# Patient Record
Sex: MALE | Race: WHITE | Employment: OTHER | URBAN - METROPOLITAN AREA
[De-identification: names, ages, dates, MRNs, and addresses within clinical notes are randomized per-mention and may not be internally consistent; named-entity substitution may affect disease eponyms.]

---

## 2022-08-08 NOTE — PROGRESS NOTES
Patient reached ____ yes  __X___ no   VM instructions left X____ yes   phone number _[phone]_______                                ____ no-office notified          Date __8/11/22_______  Time _0800______  Arrival __0630  h[facility]____    Nothing to eat or drink after midnight-follow your doctors prep instructions-this may include taking a second dose of your prep after midnight  Responsible adult 25 or older to stay on site while you are here-drive you home-stay with you after  Follow any instructions your doctors office has given you  Bring a complete list of all your medications and supplements including name,dose,how often taken the day of your procedure  If you normally take the following medications in the morning please do so the AM of your procedure with a small sip of water       Heart,blood pressure,seizure,thyroid or breathing medications-use your inhalers       DO NOT take blood pressure medications ending in \"kip\" or \"pril\" the AM of procedure or evening prior  Take half or your normal dose of any long acting insulins the night before your procedure-do not take any diabetic medications the AM of procedure  Follow your doctors instructions regarding stopping or taking  any blood thinners-if you do not have instructions-call them- INSTRUCTIONS TO [address] [place] [address]  Any questions call your doctor  Other ______________________________________________________________                [facility] POLICY(subject to change)             The current policy is 2 visitors per patient. There are no children allowed. Everyone must mask. Visiting hours are 8a-8p. Overnight visitors will be at the discretion of the nurse.

## 2022-08-11 ENCOUNTER — HOSPITAL ENCOUNTER (OUTPATIENT)
Age: 87
Setting detail: OUTPATIENT SURGERY
Discharge: HOME | End: 2022-08-11
Attending: INTERNAL MEDICINE | Admitting: INTERNAL MEDICINE
Payer: MEDICARE

## 2022-08-11 ENCOUNTER — ANESTHESIA EVENT (OUTPATIENT)
Dept: ENDOSCOPY | Age: 87
End: 2022-08-11
Payer: MEDICARE

## 2022-08-11 ENCOUNTER — ANESTHESIA (OUTPATIENT)
Dept: ENDOSCOPY | Age: 87
End: 2022-08-11
Payer: MEDICARE

## 2022-08-11 VITALS
OXYGEN SATURATION: 94 % | TEMPERATURE: 97 F | BODY MASS INDEX: 27.55 KG/M2 | SYSTOLIC BLOOD PRESSURE: 152 MMHG | HEART RATE: 55 BPM | HEIGHT: 69 IN | RESPIRATION RATE: 16 BRPM | DIASTOLIC BLOOD PRESSURE: 81 MMHG | WEIGHT: 186 LBS

## 2022-08-11 DIAGNOSIS — R13.10 DYSPHAGIA, UNSPECIFIED TYPE: ICD-10-CM

## 2022-08-11 PROCEDURE — 7100000011 HC PHASE II RECOVERY - ADDTL 15 MIN: Performed by: INTERNAL MEDICINE

## 2022-08-11 PROCEDURE — 7100000001 HC PACU RECOVERY - ADDTL 15 MIN: Performed by: INTERNAL MEDICINE

## 2022-08-11 PROCEDURE — 2709999900 HC NON-CHARGEABLE SUPPLY: Performed by: INTERNAL MEDICINE

## 2022-08-11 PROCEDURE — 2580000003 HC RX 258: Performed by: NURSE ANESTHETIST, CERTIFIED REGISTERED

## 2022-08-11 PROCEDURE — 3700000000 HC ANESTHESIA ATTENDED CARE: Performed by: INTERNAL MEDICINE

## 2022-08-11 PROCEDURE — 2500000003 HC RX 250 WO HCPCS: Performed by: NURSE ANESTHETIST, CERTIFIED REGISTERED

## 2022-08-11 PROCEDURE — 3700000001 HC ADD 15 MINUTES (ANESTHESIA): Performed by: INTERNAL MEDICINE

## 2022-08-11 PROCEDURE — 2580000003 HC RX 258: Performed by: ANESTHESIOLOGY

## 2022-08-11 PROCEDURE — 6360000002 HC RX W HCPCS: Performed by: NURSE ANESTHETIST, CERTIFIED REGISTERED

## 2022-08-11 PROCEDURE — 3609012400 HC EGD TRANSORAL BIOPSY SINGLE/MULTIPLE: Performed by: INTERNAL MEDICINE

## 2022-08-11 PROCEDURE — 7100000000 HC PACU RECOVERY - FIRST 15 MIN: Performed by: INTERNAL MEDICINE

## 2022-08-11 PROCEDURE — 88305 TISSUE EXAM BY PATHOLOGIST: CPT

## 2022-08-11 PROCEDURE — 3609015300 HC ESOPHAGEAL DILATION MALONEY: Performed by: INTERNAL MEDICINE

## 2022-08-11 PROCEDURE — 7100000010 HC PHASE II RECOVERY - FIRST 15 MIN: Performed by: INTERNAL MEDICINE

## 2022-08-11 RX ORDER — LEFLUNOMIDE 10 MG/1
10 TABLET ORAL DAILY
COMMUNITY

## 2022-08-11 RX ORDER — LEVETIRACETAM 500 MG/1
500 TABLET ORAL 2 TIMES DAILY
COMMUNITY

## 2022-08-11 RX ORDER — PROPOFOL 10 MG/ML
INJECTION, EMULSION INTRAVENOUS PRN
Status: DISCONTINUED | OUTPATIENT
Start: 2022-08-11 | End: 2022-08-11 | Stop reason: SDUPTHER

## 2022-08-11 RX ORDER — CARBAMAZEPINE 100 MG/1
100 TABLET, EXTENDED RELEASE ORAL 3 TIMES DAILY
COMMUNITY

## 2022-08-11 RX ORDER — METOPROLOL SUCCINATE 50 MG/1
50 TABLET, EXTENDED RELEASE ORAL DAILY
COMMUNITY

## 2022-08-11 RX ORDER — SODIUM CHLORIDE 9 MG/ML
INJECTION, SOLUTION INTRAVENOUS CONTINUOUS
Status: DISCONTINUED | OUTPATIENT
Start: 2022-08-11 | End: 2022-08-11 | Stop reason: HOSPADM

## 2022-08-11 RX ORDER — FLUTICASONE FUROATE, UMECLIDINIUM BROMIDE AND VILANTEROL TRIFENATATE 200; 62.5; 25 UG/1; UG/1; UG/1
POWDER RESPIRATORY (INHALATION)
COMMUNITY

## 2022-08-11 RX ORDER — PANTOPRAZOLE SODIUM 20 MG/1
60 TABLET, DELAYED RELEASE ORAL DAILY
COMMUNITY

## 2022-08-11 RX ORDER — SODIUM CHLORIDE 9 MG/ML
INJECTION, SOLUTION INTRAVENOUS CONTINUOUS PRN
Status: DISCONTINUED | OUTPATIENT
Start: 2022-08-11 | End: 2022-08-11 | Stop reason: SDUPTHER

## 2022-08-11 RX ORDER — LIDOCAINE HYDROCHLORIDE 20 MG/ML
INJECTION, SOLUTION EPIDURAL; INFILTRATION; INTRACAUDAL; PERINEURAL PRN
Status: DISCONTINUED | OUTPATIENT
Start: 2022-08-11 | End: 2022-08-11 | Stop reason: SDUPTHER

## 2022-08-11 RX ADMIN — PROPOFOL 20 MG: 10 INJECTION, EMULSION INTRAVENOUS at 08:40

## 2022-08-11 RX ADMIN — PROPOFOL 40 MG: 10 INJECTION, EMULSION INTRAVENOUS at 08:35

## 2022-08-11 RX ADMIN — SODIUM CHLORIDE: 9 INJECTION, SOLUTION INTRAVENOUS at 08:30

## 2022-08-11 RX ADMIN — SODIUM CHLORIDE: 9 INJECTION, SOLUTION INTRAVENOUS at 07:14

## 2022-08-11 RX ADMIN — PROPOFOL 20 MG: 10 INJECTION, EMULSION INTRAVENOUS at 08:38

## 2022-08-11 RX ADMIN — LIDOCAINE HYDROCHLORIDE 80 MG: 20 INJECTION, SOLUTION EPIDURAL; INFILTRATION; INTRACAUDAL; PERINEURAL at 08:35

## 2022-08-11 RX ADMIN — PROPOFOL 20 MG: 10 INJECTION, EMULSION INTRAVENOUS at 08:37

## 2022-08-11 ASSESSMENT — PAIN SCALES - GENERAL
PAINLEVEL_OUTOF10: 0
PAINLEVEL_OUTOF10: 0

## 2022-08-11 ASSESSMENT — PAIN - FUNCTIONAL ASSESSMENT: PAIN_FUNCTIONAL_ASSESSMENT: 0-10

## 2022-08-11 NOTE — DISCHARGE INSTRUCTIONS
EGD DISCHARGE INSTRUCTIONS    Use salt water gargle, lozenges, or Chloraseptic spray as needed for sore throat. If you have fever, chills, excessive bleeding, severe chest pain, or abdominal pain, or any other problems, contact your physician's office immediately at [phone]. If you had an esophageal dilatation, and experience fever, chills, excessive bleeding, shortness of breath, chest or abdominal pain, or any other unusual symptom, call the office immediately at [phone]. Continue home medications as directed. Call physician's office in five to seven business days for biopsy results or further instructions. Call your physician at [phone] for a follow up appointment in ______________    You need another EGD in _______________________________    See your physician's report for procedure details and recommendations. ANESTHESIA DISCHARGE INSTRUCTIONS    Wear your seatbelt home. A responsible adult needs to be with you for 24 hours  You are under the influence of drugs-do not drink alcohol, drive ,operate machinery,or make any important decisions or sign any legal documentsfor 24 hours. You may resume normal activities tomorrow. You may experience lightheadedness,dizziness,or sleepiness following surgery. Rest at home today - increase activity as tolerated. It is recommended to take a four hour nap after procedure. Progress slowly to a regular diet unless your physician has instructed you otherwise. Avoid spicy and greasy food on first meal.  Drink plenty of water. If nausea becomes a problem call your physician. Call your doctor if concerns arise.

## 2022-08-11 NOTE — PROGRESS NOTES
Pt awake, alert and oriented. Denies c/o pain or discomfort, pt refuses drink at this time,but feels ready for discharge. Pt transitioned to phase II level of care.

## 2022-08-11 NOTE — PROGRESS NOTES
Pt admitted to PACU via stretcher from endoscopy. Report received, assessment complete. VSS. Pt unresponsive from anesthesia at this time. O2 sat 100% on O2 at 10 liters per simple mask. Continuous pulse ox in place.

## 2022-08-11 NOTE — PROGRESS NOTES
RN aroused pt out of deep sleep, O2 off to room air, bite block removed, pt denies c/o pain at this time.

## 2022-08-11 NOTE — H&P
Gastroenterology Note             Pre-operative History and Physical    Patient: Jessica Suazo  : 1935  CSN:     History Obtained From:  patient and/or guardian. HISTORY OF PRESENT ILLNESS:    The patient is a 81 y.o. male  here for EGD  This a very pleasant 60-year-old male who came to the office has been losing weight he cannot swallow or doing an upper endoscopy today to make sure he does not have an esophageal cancer    Past Medical History:    Past Medical History:   Diagnosis Date    Atrial fibrillation ([facility] [address]     Hyperlipidemia     Seizures ([facility] [address]      Past Surgical History:    History reviewed. No pertinent surgical history. Medications Prior to Admission:   No current facility-administered medications on file prior to encounter. Current Outpatient Medications on File Prior to Encounter   Medication Sig Dispense Refill    carBAMazepine (TEGRETOL XR) 100 MG extended release tablet Take 100 mg by mouth in the morning, at noon, and at bedtime      levETIRAcetam (KEPPRA) 500 MG tablet Take 500 mg by mouth in the morning and 500 mg before bedtime. pantoprazole (PROTONIX) 20 MG tablet Take 60 mg by mouth in the morning. leflunomide (ARAVA) 10 MG tablet Take 10 mg by mouth in the morning. Rosuvastatin Calcium 20 MG CPSP Take by mouth      metoprolol succinate (TOPROL XL) 50 MG extended release tablet Take 50 mg by mouth in the morning. apixaban (ELIQUIS) 5 MG TABS tablet Take by mouth 2 times daily      Fluticasone-Umeclidin-Vilant (TRELEGY ELLIPTA) 200-62.5-25 MCG/INH AEPB Inhale into the lungs          Allergies:  Dilantin [phenytoin]      Social History:   Social History     Tobacco Use    Smoking status: Not on file    Smokeless tobacco: Not on file   Substance Use Topics    Alcohol use: Not on file     Family History:   History reviewed. No pertinent family history.     PHYSICAL EXAM:      BP (!) 154/84   Pulse 67   Temp (!) 96.7 °F (35.9 °C) (Temporal)   Resp 16 Ht 5' 9\" (1.753 m)   Wt 186 lb (84.4 kg)   SpO2 97%   BMI 27.47 kg/m²  I        Heart:   RRR, normal s1s2    Lungs:  CTA bilat,  Normal effort    Abdomen:   NT, ND      ASA Grade:  ASA 3 - Patient with moderate systemic disease with functional limitations    Mallampati Class: 2          ASSESSMENT AND PLAN:    1. Patient is a 81 y.o. male here for EGD/with MAC.   2.  Procedure options, risks and benefits reviewed with patient. Patient expresses understanding.     Farheen Camacho MD,   [address]  8/11/2022

## 2022-08-11 NOTE — ANESTHESIA POSTPROCEDURE EVALUATION
Department of Anesthesiology  Postprocedure Note    Patient: Yeni Bell  MRN: [id number]  YOB: 1935  Date of evaluation: 8/11/2022      Procedure Summary     Date: 08/11/22 Room / Location: [address] [place] [address]    Anesthesia Start: 0830 Anesthesia Stop: 0845    Procedures:       EGD BIOPSY (Abdomen)      ESOPHAGEAL DILATION GARCIA Diagnosis:       Dysphagia, unspecified type      (Dysphagia, unspecified type [R13.10])    Surgeons: Ramin Ramesh MD Responsible Provider: Jay Jay Nazario MD    Anesthesia Type: MAC ASA Status: 3          Anesthesia Type: No value filed. Vikas Phase I: Vikas Score: 10    Vikas Phase II: Vikas Score: 10      Anesthesia Post Evaluation    Patient location during evaluation: PACU  Patient participation: complete - patient participated  Level of consciousness: awake  Airway patency: patent  Nausea & Vomiting: no vomiting  Complications: no  Cardiovascular status: hemodynamically stable  Respiratory status: acceptable  Hydration status: euvolemic  There was medical reason for not using a multimodal analgesia pain management approach.

## 2022-08-11 NOTE — PROCEDURES
Endoscopy Note    Patient: Estephanie Poster  : 1935  CSN:     Procedure: Esophagogastroduodenoscopy with biopsy, esophageal dilation    Date:  2022     Surgeon:  Pinky De Dios MD     Referring Physician: Yadira cintron    Preoperative Diagnosis: Dysphagia acute    Postoperative Diagnosis: #1 age-related changes of the esophagus #2 no evidence of a stricture #3 no evidence of a tumor blocking the esophagus  #4 2 cm hiatal hernia  #5 gastritis    Anesthesia: Entered anesthesia care    EBL: <5 mL    Indications: This is a 81y.o. year old male who presents came to my office complaining of acute dysphagia difficulty swallowing losing weight he felt that he needed to have an upper endoscopy done quite urgently we had him come in today for an EGD    Description of Procedure:  Informed consent was obtained from the patient after explanation of indications, benefits and possible risks and complications of the procedure. The patient was then taken to the endoscopy suite, placed in the left lateral decubitus position and the above IV sedation was administrered. The Olympus videoendoscope was passed through the hypopharynx   Posterior pharynx normal    Esophagus patient had lots of spasms of the esophagus there was no evidence of an overt stricture he does have a little bit of age-related changes  Did place a 55 Montiel dilator without any difficulty    Hiatal hernia 2 cm    Stomach there was some inflammation in the antrum I did biopsy for H. pylori there were no ulcers or ulceration    Duodenum was normal in the bulb sweep distal duodenum    Reflection did show the hiatal hernia  Gastric or Duodenal ulcer present: No      The patient tolerated the procedure well and was taken to the post anesthesia care unit in good condition. Impression: Age related changes most likely the cause of dysphagia  Gastritis  Hiatal hernia    Recommendations:  At this time we would just recommend the patient be on a proton pump inhibitor    I would have him follow-up with me in the clinic in 3 months to see how he is doing    Continues to have issues with difficulty swallowing and I would obtain an esophagram with 13 mm pill swallow    You for this very kind referral    Sarah Post MD, MD  [facility]  [phone]

## 2022-09-06 ENCOUNTER — HOSPITAL ENCOUNTER (OUTPATIENT)
Dept: GENERAL RADIOLOGY | Age: 87
Discharge: HOME | End: 2022-09-06
Payer: MEDICARE

## 2022-09-06 DIAGNOSIS — R13.10 DYSPHAGIA, UNSPECIFIED TYPE: ICD-10-CM

## 2022-09-06 PROCEDURE — 92526 ORAL FUNCTION THERAPY: CPT

## 2022-09-06 PROCEDURE — 74230 X-RAY XM SWLNG FUNCJ C+: CPT

## 2022-09-06 PROCEDURE — 92611 MOTION FLUOROSCOPY/SWALLOW: CPT

## 2022-09-06 NOTE — PROCEDURES
Facility/Department: [address] [place] [address] EVALUATION    Patient: Andreea Waldron   : 1935   MRN: [id number]      Evaluation Date: 2022   Admitting Diagnosis: Dysphagia, unspecified type [R13.10]  Treatment Diagnosis: Dysphagia   Pain: denies                         Ordering MD: Ralph Horton MD  Radiologist: [unfilled]   Date of Onset: 22  Date of Evaluation: 2022  Type of Study: Modified Barium Swallowing Study (MBS)  Diet Prior to Study:  Regular diet with thin liquids  Reason for referral/HPI: Pt reports sensation of solids and pills with water becoming \"stuck\" in his throat. Pt underwent EGD with dilation approximately 3 weeks ago (see report). Pt reports initial relief following dilation but eventual return of his symptoms. Impression:  Modified Barium Swallow evaluation completed on 2022. Patient presents with moderate oropharyngeal dysphagia with persistent SILENT laryngeal penetration and episodic SILENT aspiration noted with thin liquids. Intermittent self clearing laryngeal penetration also noted with mildly thick (nectar) thick liquids. Premature bolus loss to pharynx, delayed and reduced airway protection during the swallow, reduced pharyngeal and laryngeal sensation and delayed/reduced pharyngeal clearing after the swallow, contribute to severity of dysphagia. Use of compensatory chin tuck was noted to minimize aspiration but did not completely eliminate laryngeal penetration with thin liquids. Sensation for timely and effective reflexive cough in the presence of deep laryngeal penetration and aspiration with thin liquids is ABSENT. However, delayed throat clears following penetration/aspiration episodes were noted. Persistence of laryngeal penetration/aspiration with thin liquids likely contributes to upper airway irritation and to Pt's reported sensation of something \"stuck\" in his throat.    Extensive education regarding nature of aspiration with thin liquids, texture and treatment recommendations was provided to the Pt and his daughter following this study. Outpatient Speech Therapy for Dysphagia Treatment is recommended per current MBS results. Pt and his daughter verbalized understanding and agreement with recommendations. Aspiration/Penetration Risk:  High risk with thin liquids in large bolus amounts, in combination with solids, and with neutral head posture. Mild risk with thin liquids in small bolus amounts and with consistent use of chin tuck with thins. Recommendations:    Diet Level:   Regular texture diet  with Thin liquids, No straws   Medication: Meds in puree     Referral: Based on findings from MBS, consider referral to Outpatient Speech Therapy   Strategies: Upright as possible with all PO intake , No straws , Chin tuck , Small bites/sips , Swallow 2 times per bite , Eat/feed slowly, Remain upright 30-45 min     Treatments: Recommend referral to outpatient SLP for dysphagia therapy.  Pt will require script/referral from MD   Goals:  Pt will functionally tolerate recommended diet level with use of recommended compensatory strategies with no s/s of aspiration/penetration    Pt/family will demonstrate understanding of results Modified Barium Swallow Study, risk for aspiration, rationale for diet level and compensatory strategies  Pt will demonstrate improved sensory motor function via targeted exercises and appropriate treatment modalities   Pt will tolerate advanced to least restrictive diet as evidenced by repeat instrumental swallow study     Consistencies given: thin liquids, mildly (nectar) thick liquids , moderately (honey) thick liquids , puree , soft solids, mixed consistency , and regular solids     Oral Phase  Decreased bolus control   Premature bolus loss   Impaired A-P bolus transport   Oral residue     Pharyngeal Phase  Pharyngeal pooling prior to swallow initiation   Delayed swallow initiation   Decreased hyolaryngeal excursion   Delayed epiglottic inversion   Decreased tongue base retraction   Pharyngeal residue     Penetration/Aspiration Scores across consistencies   CONSISTENCY  Pen/Asp rating Description    Thin   3) Material enters the airway, remains above the vocal folds, and is not ejected from the airway  5) Material enters the airway, contacts the vocal folds, and is not ejected from the airway  8) Material enters the airway, passes below the vocal folds, and no effort is made to eject     Mildly (nectar) thick   1) Material does not enter the airway  2) Material enters the airway, remains above the vocal folds, and is ejected from the airway     Moderately (honey) thick   1) Material does not enter the airway     Puree   1) Material does not enter the airway     Soft Solid   1) Material does not enter the airway     Regular Solid   1) Material does not enter the airway            Esophageal Phase  Unremarkable    Following Evaluation:  Provided education regarding role of SLP, results of assessment, recommendations and general speech pathology plan of care.    [x] Pt verbalized understanding and agreement   [x] Pt requires ongoing learning   [] No evidence of comprehension     Timed Code Treatment: 0 min    Total Treatment Time: 60 min    Tim COHEN-CHEYANNE#[id number]

## 2024-11-13 NOTE — ANESTHESIA PRE PROCEDURE
Giovanny Camacho is requesting a refill on the following medication(s):  Requested Prescriptions     Pending Prescriptions Disp Refills    lisinopril (PRINIVIL;ZESTRIL) 10 MG tablet [Pharmacy Med Name: Lisinopril 10 MG Oral Tablet] 90 tablet 0     Sig: Take 1 tablet by mouth once daily       Last Visit Date (If Applicable):  8/13/2024    Next Visit Date:    2/13/2025   Department of Anesthesiology  Preprocedure Note       Name:  Aurea Armijo   Age:  81 y.o.  :  1935                                          MRN:  [id number]         Date:  2022      Surgeon: Rodney Tapia):  Fabiana Rico MD    Procedure: Procedure(s):  EGD DIAGNOSTIC ONLY    Medications prior to admission:   Prior to Admission medications    Not on File       Current medications:    No current facility-administered medications for this encounter. Allergies: Allergies   Allergen Reactions    Dilantin [Phenytoin] Itching       Problem List:  There is no problem list on file for this patient. Past Medical History:        Diagnosis Date    Atrial fibrillation ([facility] [address]     Hyperlipidemia     Seizures ([facility] [address]        Past Surgical History:  No past surgical history on file. Social History:    Social History     Tobacco Use    Smoking status: Not on file    Smokeless tobacco: Not on file   Substance Use Topics    Alcohol use: Not on file                                Counseling given: Not Answered      Vital Signs (Current):   Vitals:    22 0658   BP: (!) 154/84   Pulse: 67   Resp: 16   Temp: (!) 96.7 °F (35.9 °C)   TempSrc: Temporal   SpO2: 97%   Weight: 186 lb (84.4 kg)   Height: 5' 9\" (1.753 m)                                              BP Readings from Last 3 Encounters:   22 (!) 154/84       NPO Status:                                                                                 BMI:   Wt Readings from Last 3 Encounters:   22 186 lb (84.4 kg)     Body mass index is 27.47 kg/m². CBC: No results found for: WBC, RBC, HGB, HCT, MCV, RDW, PLT    CMP: No results found for: NA, K, CL, CO2, BUN, CREATININE, GFRAA, AGRATIO, LABGLOM, GLUCOSE, GLU, PROT, CALCIUM, BILITOT, ALKPHOS, AST, ALT    POC Tests: No results for input(s): POCGLU, POCNA, POCK, POCCL, POCBUN, POCHEMO, POCHCT in the last 72 hours. Coags: No results found for: PROTIME, INR, APTT    HCG (If Applicable):  No results found for: PREGTESTUR, PREGSERUM, HCG, HCGQUANT     ABGs: No results found for: PHART, PO2ART, NTR0MFJ, LKM3FGE, BEART, [id number]     Type & Screen (If Applicable):  No results found for: LABABO, LABRH    Drug/Infectious Status (If Applicable):  No results found for: HIV, HEPCAB    COVID-19 Screening (If Applicable): No results found for: COVID19        Anesthesia Evaluation  Patient summary reviewed and Nursing notes reviewed no history of anesthetic complications:   Airway: Mallampati: II  TM distance: >3 FB   Neck ROM: full  Mouth opening: > = 3 FB   Dental:    (+) upper dentures      Pulmonary:       (-) rhonchi and wheezes                          ROS comment: occ inhalers since COVID due to cough   Cardiovascular:  Exercise tolerance: good (>4 METS),   (+) dysrhythmias:, CHF:, hyperlipidemia        Rhythm: regular  Rate: normal           Beta Blocker:  Dose within 24 Hrs         Neuro/Psych:   (+) seizures (no seizure in 21 yrs): well controlled,             GI/Hepatic/Renal:   (+) GERD: well controlled,           Endo/Other:    (+) blood dyscrasia: anticoagulation therapy:., .                 Abdominal:             Vascular:     - DVT and PE. Other Findings:           Anesthesia Plan      MAC     ASA 3       Induction: intravenous. Anesthetic plan and risks discussed with patient. Plan discussed with CRNA.                     Roberto Christina MD   8/11/2022

## (undated) DEVICE — ENDOSCOPIC KIT 6X3/16 FT COLON W/ 1.1 OZ 2 GWN W/O BRSH

## (undated) DEVICE — VALVE SUCTION AIR H2O SET ORCA POD + DISP

## (undated) DEVICE — AIR/WATER CLEANING ADAPTER FOR OLYMPUS® GI ENDOSCOPE: Brand: BULLDOG®

## (undated) DEVICE — SOLUTION IV IRRIG WATER 500ML POUR BRL ST 2F7113

## (undated) DEVICE — MOUTHPIECE ENDOSCP L CTRL OPN AND SIDE PORTS DISP

## (undated) DEVICE — BW-412T DISP COMBO CLEANING BRUSH: Brand: SINGLE USE COMBINATION CLEANING BRUSH

## (undated) DEVICE — FORCEPS BX L240CM WRK CHN 2.8MM STD CAP W/ NDL MIC MESH